# Patient Record
Sex: FEMALE | Race: WHITE
[De-identification: names, ages, dates, MRNs, and addresses within clinical notes are randomized per-mention and may not be internally consistent; named-entity substitution may affect disease eponyms.]

---

## 2018-08-23 ENCOUNTER — HOSPITAL ENCOUNTER (INPATIENT)
Dept: HOSPITAL 80 - FLD | Age: 33
LOS: 6 days | Discharge: HOME | End: 2018-08-29
Attending: OBSTETRICS & GYNECOLOGY | Admitting: OBSTETRICS & GYNECOLOGY
Payer: COMMERCIAL

## 2018-08-23 DIAGNOSIS — Z14.1: ICD-10-CM

## 2018-08-23 DIAGNOSIS — O32.6XX0: ICD-10-CM

## 2018-08-23 DIAGNOSIS — O48.0: ICD-10-CM

## 2018-08-23 DIAGNOSIS — Z3A.42: ICD-10-CM

## 2018-08-27 LAB — PLATELET # BLD: 214 10^3/UL (ref 150–400)

## 2018-08-27 PROCEDURE — 0KQM0ZZ REPAIR PERINEUM MUSCLE, OPEN APPROACH: ICD-10-PCS | Performed by: OBSTETRICS & GYNECOLOGY

## 2018-08-27 RX ADMIN — PRAMOXINE HYDROCHLORIDE HYDROCORTISONE ACETATE SCH APP: 100; 100 AEROSOL, FOAM TOPICAL at 21:00

## 2018-08-27 RX ADMIN — DOCUSATE SODIUM PRN MG: 100 CAPSULE, LIQUID FILLED ORAL at 21:45

## 2018-08-27 RX ADMIN — IBUPROFEN SCH MG: 600 TABLET ORAL at 21:46

## 2018-08-27 RX ADMIN — PRAMOXINE HYDROCHLORIDE HYDROCORTISONE ACETATE SCH APP: 100; 100 AEROSOL, FOAM TOPICAL at 15:33

## 2018-08-27 RX ADMIN — ACETAMINOPHEN SCH: 325 TABLET ORAL at 23:08

## 2018-08-27 NOTE — PDGENHP
History and Physical


History and Physical: 





PRENATAL CARE:  [facility]





HPI: 


Patient is a 32 yo G 1  P 0  @ 41 weeks 6 days that presents to L&D with 

complaints of SROM and uterine ctx.


EDC: 8/14/18 which is based on LMP: 11/07/18 which is known and consistent with 

Ultrasound at 8 weeks.


Her pregnancy is complicated by:  + ABS which is clinically insignificant, + CF 

carrier screen, varicella Non-Immune.





Review of Systems:


Constitutional: Denies any fever, chills, or fatigue


HEENT: denies any visual changes, difficulty swallowing, hearing loss


Cardiovascular: Denies any chest pain, palpitations, leg swelling


Respiratory: denies any cough, wheezing, or shortness of breathe


GI: Denies any nausea, vomiting, diarrhea, constipation


: denies any dysuria, urgency, frequency, vaginal bleeding


Musculoskeletal: denies any muscle or bone pain


Skin: denies any rashes


Neuro: denies any headache, seizures, lightheadedness, dizziness, or loss of 

consciousness


Psychiatric: denies any depression, anxiety, or SI/HI thoughts





HISTORY: 


Previous OB history:  nullip


Past medical history:  neg


Past surgical history: neg


Medications: PNV 


Allergies (list reaction): NKDA





PRENATAL LABS:


Rh: O+


ABS: Pos


Rubella:  Immune


HbsAg: NR


HIV: NR


VDRL: NR


1hr:  pt declined


GC: Neg


Chlamydia: Neg 


Pap: Normal


GBS:  neg





BMI: (prepreg) 23





PHYSICAL EXAM:


Constitutional: WNL, A&Ox3


HEENT: normocephalic atraumatic, supple


Heart: RRR, no murmur


Chest: CTA-B


Abdomen: Soft, nontender, gravid


SVE: 5/90/-3 at 0700


Extremities: trace edema, negative homans sign


Neuro: grossly normal


Psych: normal affect





Fetal assessment:  FHT baseline 145, +accels, no decels, moderate variability 


Contractions: toco q 2-3





Assessment: 


1) 32 yo G 1 P 0 with IUP@ 41w6d


2) spontaneous labor


3) GBS neg 


4) Cat 1 FHR tracing


5) coping well with labor





Plan: 


1) Admit to L&D


2) Anticipate vag delivery


3) Intermittent monitoring per AWHONN guidelines


4) Pt desires low intervention.

## 2018-08-27 NOTE — OBPROG
Labor Progress Note


Assessment/Plan: 


Assessment:


























Plan:





08/27/18 11:18


IUP at 41 w 6 d


active labor


Objective: 





 





 08/27/18 04:15 





 











Patient ABO/Rh  O POSITIVE   08/27/18  04:15    














- SVE


Dilation (cm): 9


Effacement (%): 90


Station: 0


Membranes: SROM


Amniotic Fluid Color: Clear





- Contraction Pattern Assessment


Current Contraction Pattern: Regular (q 5 min)





- FHR Assessment


  ** Nice


FHR (bpm): 135 (130-140 with intermittent auscultation, no audible decels with 

ctx)





- AP


Antepartum Course: 





08/27/18 11:20


anticipate vaginal delivery, Dr. Navarro aware of pt progress





Oxytocin Orders Assessment





- Pre-Induction/Augmentation Assessment


Gestational Age: 41 week(s) and 6 day(s)





ICD10 Worksheet


Patient Problems: 


 Problems











Problem Status Onset


 


Labor without complication Acute  


 


Post term pregnancy at 41 weeks gestation Acute  














- ICD10 Problem Qualifiers


(1) Post term pregnancy at 41 weeks gestation








(2) Labor without complication

## 2018-08-27 NOTE — OBDEL
Birth Info


Birth Type: Vaginal


Presentation at Delivery: Vertex


L&D Analgesia/Anesthesia Type: None


GBS+: No


Indications for Delivery: Spontaneous Labor, SROM





Vaginal Delivery





- Delivery Provider


Delivery Physician/CNM: Juanis Sandra (Dr. Jes Mac proctex)





- Labor and Delivery


Onset of Contractions Date: 18


Onset of Contractions Time: 05:30


Onset of Contractions Type: Spontaneous


Rupture of Membranes Date: 18


Rupture of Membranes Time: 01:20


Rupture of Membranes Type: Spontaneous


Amniotic Fluid Color: Clear


Placenta Delivery Date: 18


Placenta Delivery Time: 14:36


Total Hours of Labor: 9


Laceration: 2nd Degree


Repair: 3-0, Vicryl


Vaginal Sponge Count Correct: Yes


Vaginal Needle Count Correct: Yes


Vaginal Sweep Performed: Yes


EBL: 250


Delivery Events: Other (Specify) (compound presentation, R hand by face)


Delivery Comment: 





Mom progressed well to complete with  and partner support for pain 

management.  Pushed ineffectively after complete until small crown, then pushed 

well with ctx.  FHT reassuring throughout 2nd stage with occasional decel 

during contraction to  bpm, then return to baseline.  Head delivered in 

TRISTAN over intact perineum. Shoulders and body followed atraumatically with 

maternal pushing effort on next contraction.  Compound hand noted at time of 

delivery.  INfant placed on maternal abdomen and assessed by RN. Delayed cord 

clamping supported for family.  FOB cut cord at appoximately 7 min. Placenta 

delivered spontaneous and appeared intact.  Perineum inspected and 2nd degree 

laceration repaired in usual fashion with 3.0 vicryl under local anesthesia.  

 ml. Mother and baby assisted with breast feeding, both stable in room 

at this time.





- Medications


Labor Augmentation/Induction Methods Used: None


Labor Augmentation/Induction Indication: Post Dates





[place] Birth Data


RONALD: 18


Gestational Age: 41 week(s) and 6 day(s)


  ** Nice


Delivery Date: 18


Delivery Time: 14:26


Sex of Infant: Female


Apgar Score (1 Min): 8


Apgar Score (5 Min): 9 (weight not available at this time)





ICD10 Worksheet


Patient Problems: 


 Problems











Problem Status Onset


 


Labor without complication Acute  


 


Post term pregnancy at 41 weeks gestation Acute  














- ICD10 Problem Qualifiers


(1) Post term pregnancy at 41 weeks gestation








(2) Labor without complication

## 2018-08-28 RX ADMIN — ACETAMINOPHEN SCH: 325 TABLET ORAL at 05:00

## 2018-08-28 RX ADMIN — IBUPROFEN SCH MG: 600 TABLET ORAL at 04:11

## 2018-08-28 RX ADMIN — PRAMOXINE HYDROCHLORIDE HYDROCORTISONE ACETATE SCH: 100; 100 AEROSOL, FOAM TOPICAL at 17:50

## 2018-08-28 RX ADMIN — ACETAMINOPHEN SCH: 325 TABLET ORAL at 23:55

## 2018-08-28 RX ADMIN — IBUPROFEN SCH MG: 600 TABLET ORAL at 11:44

## 2018-08-28 RX ADMIN — PRAMOXINE HYDROCHLORIDE HYDROCORTISONE ACETATE SCH APP: 100; 100 AEROSOL, FOAM TOPICAL at 14:52

## 2018-08-28 RX ADMIN — IBUPROFEN SCH MG: 600 TABLET ORAL at 23:46

## 2018-08-28 RX ADMIN — PRAMOXINE HYDROCHLORIDE HYDROCORTISONE ACETATE SCH APP: 100; 100 AEROSOL, FOAM TOPICAL at 21:00

## 2018-08-28 RX ADMIN — DOCUSATE SODIUM PRN MG: 100 CAPSULE, LIQUID FILLED ORAL at 11:44

## 2018-08-28 RX ADMIN — ACETAMINOPHEN SCH: 325 TABLET ORAL at 17:52

## 2018-08-28 RX ADMIN — IBUPROFEN SCH MG: 600 TABLET ORAL at 17:51

## 2018-08-28 RX ADMIN — PRAMOXINE HYDROCHLORIDE HYDROCORTISONE ACETATE SCH APP: 100; 100 AEROSOL, FOAM TOPICAL at 11:45

## 2018-08-28 RX ADMIN — ACETAMINOPHEN SCH: 325 TABLET ORAL at 11:42

## 2018-08-28 NOTE — OBPP
PostPartum Progress Note


Assessment/Plan: 


Assessment:





34 y/o G1 s/p    


PPD #1


Breastfeeding 


2nd degree perineal lac























Plan:


Routine PP care


Breastfeeding support 


Encourage activity





18 17:17





Subjective/Postpartum Course: 





18 17:20


Doing well this morning. Breastfeeding going well.  Tolerating regular diet, 

voiding, vaginal bleeding WNL.  Pain well controlled with ibuprofen.  


Objective: 





 





 18 04:15 





 











Temp Pulse Resp BP Pulse Ox


 


 36.4 C   97   16   114/78   96 


 


 18 08:00  18 08:00  18 08:00  18 08:00  18 08:00











Uterine Position/Fundal Height: Umbilicus -1


Uterine Tone: Firm





PostPartum Physical Exam





- Physical Exam


EENT: normal ENT inspection


Neck: non-tender


Respiratory: normal breath sounds


Cardiac/Chest: regular rate, rhythm


Abdomen: normal bowel sounds


Extremities: normal range of motion


Skin: normal color, warm/dry


Neuro/Psych: alert, normal mood/affect, oriented x 3

## 2018-08-29 VITALS — DIASTOLIC BLOOD PRESSURE: 72 MMHG | SYSTOLIC BLOOD PRESSURE: 114 MMHG

## 2018-08-29 RX ADMIN — ACETAMINOPHEN SCH: 325 TABLET ORAL at 12:06

## 2018-08-29 RX ADMIN — ACETAMINOPHEN SCH: 325 TABLET ORAL at 06:00

## 2018-08-29 RX ADMIN — DOCUSATE SODIUM PRN MG: 100 CAPSULE, LIQUID FILLED ORAL at 12:17

## 2018-08-29 RX ADMIN — IBUPROFEN SCH MG: 600 TABLET ORAL at 08:02

## 2018-08-29 NOTE — OBPP
PostPartum Progress Note


Assessment/Plan: 


Assessment:


PPD 2 s/p 


h/o anemia























Plan:


D/C home, routine care


18 09:49





Subjective/Postpartum Course: 





18 17:20


Doing well this morning. Breastfeeding going well.  Tolerating regular diet, 

voiding, vaginal bleeding WNL.  Pain well controlled with ibuprofen.  


18 09:50


Pt doing well.  States BF going well.  sore nipples but manageable.  urinating 

fine.  bld is lessening.  ready for d/c.  


Objective: 





 





 18 04:15 





 











Patient ABO/Rh  TNP   18  04:15    








 











Temp Pulse Resp BP Pulse Ox


 


 36.3 C   75   16   114/72   100 


 


 18 07:59  18 07:59  18 07:59  18 07:59  18 07:59











Uterine Position/Fundal Height: Umbilicus -2


Uterine Tone: Firm





PostPartum Physical Exam





- Physical Exam


Abdomen: non-tender, soft, other (FF at Umb -2, normal lochia)


Extremities: non-tender, pedal edema (mild)


Skin: normal color, warm/dry


Neuro/Psych: alert, normal mood/affect

## 2018-08-29 NOTE — OBGCSDC
General Delivery Information





- General Info


: 1


Para: 1


Abortions: 0


Birth Type: Vaginal


L&D Analgesia/Anesthesia Type: None


Admission Date: 18


Labs: 





 











Patient ABO/Rh  TNP   18  04:15    


 


Hct  39.7 % (38.0-47.0)   18  04:15    














- Hospital Course


Antepartum: 





18 11:20


anticipate vaginal delivery, Dr. Navarro aware of pt progress


Postpartum: 





18 17:20


Doing well this morning. Breastfeeding going well.  Tolerating regular diet, 

voiding, vaginal bleeding WNL.  Pain well controlled with ibuprofen.  


18 09:50


Pt doing well.  States BF going well.  sore nipples but manageable.  urinating 

fine.  bld is lessening.  ready for d/c.  





Vaginal Birth





- Delivery Provider


Delivery Physician/CNM: Juanis Sandra (Dr. Jes Mac proctoring)





- Diagnosis


Labor: Spontaneous


Rupture of Membranes Type: Spontaneous


Amniotic Fluid Color: Clear


Laceration: 2nd Degree


Repair: 3-0, Vicryl


Delivery Events: Other (Specify) (compound presentation, R hand by face)





 Birth





-  Delivery


EBL: 250





 Birth Data


RONALD: 18


Gestational Age: 42 week(s) and 1 day(s)


  ** Nice


Delivery Date: 18


Delivery Time: 14:23


Sex of Infant: Female


Reading Weight (gm): 3944 kg


Apgar Score (1 Min): 8


Apgar Score (5 Min): 9





Discharge Information





- Discharge Information


Condition: Good


Instruction/Follow Up: See Instruction Sheet, Four Weeks (with therapist), Six 

Weeks (with Juanis Sandra)